# Patient Record
Sex: MALE | Race: WHITE | NOT HISPANIC OR LATINO | URBAN - METROPOLITAN AREA
[De-identification: names, ages, dates, MRNs, and addresses within clinical notes are randomized per-mention and may not be internally consistent; named-entity substitution may affect disease eponyms.]

---

## 2024-12-08 ENCOUNTER — HOSPITAL ENCOUNTER (EMERGENCY)
Facility: HOSPITAL | Age: 24
Discharge: HOME | End: 2024-12-08
Payer: COMMERCIAL

## 2024-12-08 ENCOUNTER — APPOINTMENT (OUTPATIENT)
Dept: RADIOLOGY | Facility: HOSPITAL | Age: 24
End: 2024-12-08
Payer: COMMERCIAL

## 2024-12-08 VITALS
DIASTOLIC BLOOD PRESSURE: 93 MMHG | OXYGEN SATURATION: 97 % | HEART RATE: 73 BPM | WEIGHT: 200 LBS | SYSTOLIC BLOOD PRESSURE: 123 MMHG | BODY MASS INDEX: 29.62 KG/M2 | RESPIRATION RATE: 18 BRPM | HEIGHT: 69 IN | TEMPERATURE: 99.3 F

## 2024-12-08 DIAGNOSIS — M25.562 ACUTE PAIN OF LEFT KNEE: Primary | ICD-10-CM

## 2024-12-08 DIAGNOSIS — M25.462 EFFUSION OF LEFT KNEE: ICD-10-CM

## 2024-12-08 PROCEDURE — 73564 X-RAY EXAM KNEE 4 OR MORE: CPT | Mod: LT

## 2024-12-08 PROCEDURE — 99283 EMERGENCY DEPT VISIT LOW MDM: CPT

## 2024-12-08 PROCEDURE — 2500000001 HC RX 250 WO HCPCS SELF ADMINISTERED DRUGS (ALT 637 FOR MEDICARE OP): Performed by: PHYSICIAN ASSISTANT

## 2024-12-08 PROCEDURE — 73564 X-RAY EXAM KNEE 4 OR MORE: CPT | Mod: LEFT SIDE | Performed by: RADIOLOGY

## 2024-12-08 RX ORDER — IBUPROFEN 600 MG/1
600 TABLET ORAL ONCE
Status: COMPLETED | OUTPATIENT
Start: 2024-12-08 | End: 2024-12-08

## 2024-12-08 RX ORDER — IBUPROFEN 600 MG/1
600 TABLET ORAL EVERY 6 HOURS PRN
Qty: 20 TABLET | Refills: 0 | Status: SHIPPED | OUTPATIENT
Start: 2024-12-08

## 2024-12-08 ASSESSMENT — PAIN - FUNCTIONAL ASSESSMENT: PAIN_FUNCTIONAL_ASSESSMENT: 0-10

## 2024-12-08 ASSESSMENT — PAIN SCALES - GENERAL: PAINLEVEL_OUTOF10: 0 - NO PAIN

## 2024-12-08 ASSESSMENT — COLUMBIA-SUICIDE SEVERITY RATING SCALE - C-SSRS
6. HAVE YOU EVER DONE ANYTHING, STARTED TO DO ANYTHING, OR PREPARED TO DO ANYTHING TO END YOUR LIFE?: NO
1. IN THE PAST MONTH, HAVE YOU WISHED YOU WERE DEAD OR WISHED YOU COULD GO TO SLEEP AND NOT WAKE UP?: NO
2. HAVE YOU ACTUALLY HAD ANY THOUGHTS OF KILLING YOURSELF?: NO

## 2024-12-08 NOTE — ED PROVIDER NOTES
HPI   Chief Complaint   Patient presents with    Knee Injury     Left knee injury from snowboarding yesterday       24-year-old male presented emergency department with a chief complaint of pain in his left knee.  States he was snowboarding yesterday and landed awkwardly and instantly had pain in the knee and trouble with weightbearing.  He has been taking ibuprofen for relief.  He denies any other injury or trauma.  He denies numbness weakness.  Pain is mild to moderate.  Denies head injury.  No other complaint.              Patient History   No past medical history on file.  No past surgical history on file.  No family history on file.  Social History     Tobacco Use    Smoking status: Not on file    Smokeless tobacco: Not on file   Substance Use Topics    Alcohol use: Not on file    Drug use: Not on file       Physical Exam   ED Triage Vitals [12/08/24 1220]   Temperature Heart Rate Respirations BP   37.4 °C (99.3 °F) 73 18 (!) 123/93      Pulse Ox Temp Source Heart Rate Source Patient Position   97 % Temporal Monitor Sitting      BP Location FiO2 (%)     Right arm --       Physical Exam  Vitals and nursing note reviewed.   Constitutional:       Appearance: Normal appearance.   HENT:      Head: Normocephalic.      Nose: Nose normal.      Mouth/Throat:      Mouth: Mucous membranes are moist.   Cardiovascular:      Rate and Rhythm: Normal rate.      Pulses: Normal pulses.   Pulmonary:      Effort: Pulmonary effort is normal.   Abdominal:      General: Abdomen is flat.   Musculoskeletal:         General: Normal range of motion.      Cervical back: Normal range of motion.      Comments: Negative anterior posterior drawer.  Patient does have positive [place] with concern for medial meniscus tear.   Skin:     General: Skin is warm.   Neurological:      General: No focal deficit present.      Mental Status: He is alert and oriented to person, place, and time.   Psychiatric:         Mood and Affect: Mood normal.            ED Course & MDM   Diagnoses as of 12/08/24 2136   Acute pain of left knee   Effusion of left knee                 No data recorded     [place] Coma Scale Score: 15 (12/08/24 1221 : eBena Gonzalez, EMT)                           Medical Decision Making  I have seen and evaluated this patient.  Physician available for consultation.  Vital signs have been reviewed.  All laboratory and diagnostic imaging is reviewed by myself and interpreted by myself unless otherwise stated.  Additionally imaging is interpreted by radiologist.    X-ray with small joint effusion.  Suspect patient has meniscal injury.  Given knee immobilizer, crutches, anti-inflammatory medication, outpatient orthopedic follow-up.  Remains neurovascularly intact.  Released in stable condition from urgent standpoint.  Splint was applied by ancillary staff. The splint was checked by myself and patient is appropriately immobilized and maintains neurovascularly intact at this point in time.    Labs Reviewed - No data to display  XR knee left 4+ views   Final Result    No evidence of acute fracture. Small suprapatellar effusion. Ununited    patellar ossification center.          MACRO:    None.          Signed by: Lourdes Cazares 12/8/2024 1:45 PM    Dictation workstation:   [id number]     Medications  ibuprofen tablet 600 mg (600 mg oral Given 12/8/24 1243)  Discharge Medication List as of 12/8/2024  1:56 PM    START taking these medications    ibuprofen 600 mg tablet  Take 1 tablet (600 mg) by mouth every 6 hours if needed for mild pain (1 - 3) for up to 20 doses., Starting Sun 12/8/2024, Print                  Procedure  Procedures     Joseph Mcnulty PA-C  12/08/24 2138

## 2024-12-08 NOTE — Clinical Note
Nacho Kassandra was seen and treated in our emergency department on 12/8/2024.  He may return to work on 12/11/2024.       If you have any questions or concerns, please don't hesitate to call.      Joseph Mcnulty PA-C

## 2025-01-21 ENCOUNTER — HOSPITAL ENCOUNTER (OUTPATIENT)
Dept: RADIOLOGY | Facility: HOSPITAL | Age: 25
Discharge: HOME | End: 2025-01-21
Payer: COMMERCIAL

## 2025-01-21 DIAGNOSIS — S83.232A COMPLEX TEAR OF MEDIAL MENISCUS, CURRENT INJURY, LEFT KNEE, INITIAL ENCOUNTER: ICD-10-CM

## 2025-01-21 PROCEDURE — 73721 MRI JNT OF LWR EXTRE W/O DYE: CPT | Mod: 52,LT

## 2025-02-13 ENCOUNTER — HOSPITAL ENCOUNTER (OUTPATIENT)
Dept: RADIOLOGY | Facility: HOSPITAL | Age: 25
Discharge: HOME | End: 2025-02-13
Payer: COMMERCIAL

## 2025-02-13 DIAGNOSIS — S83.519A: ICD-10-CM

## 2025-02-13 PROCEDURE — 73721 MRI JNT OF LWR EXTRE W/O DYE: CPT | Mod: LT
